# Patient Record
Sex: MALE | Race: WHITE | Employment: OTHER | URBAN - METROPOLITAN AREA
[De-identification: names, ages, dates, MRNs, and addresses within clinical notes are randomized per-mention and may not be internally consistent; named-entity substitution may affect disease eponyms.]

---

## 2023-06-21 ENCOUNTER — ANESTHESIA (OUTPATIENT)
Dept: ENDOSCOPY | Age: 68
End: 2023-06-21
Payer: MEDICARE

## 2023-06-21 ENCOUNTER — HOSPITAL ENCOUNTER (OUTPATIENT)
Age: 68
Setting detail: OUTPATIENT SURGERY
Discharge: HOME | End: 2023-06-21
Attending: INTERNAL MEDICINE | Admitting: INTERNAL MEDICINE
Payer: MEDICARE

## 2023-06-21 ENCOUNTER — ANESTHESIA EVENT (OUTPATIENT)
Dept: ENDOSCOPY | Age: 68
End: 2023-06-21
Payer: MEDICARE

## 2023-06-21 VITALS
HEIGHT: 68 IN | WEIGHT: 190 LBS | HEART RATE: 78 BPM | BODY MASS INDEX: 28.79 KG/M2 | DIASTOLIC BLOOD PRESSURE: 86 MMHG | SYSTOLIC BLOOD PRESSURE: 131 MMHG | RESPIRATION RATE: 16 BRPM | OXYGEN SATURATION: 97 % | TEMPERATURE: 96.5 F

## 2023-06-21 DIAGNOSIS — R13.19 OTHER DYSPHAGIA: ICD-10-CM

## 2023-06-21 DIAGNOSIS — R13.19 ESOPHAGEAL DYSPHAGIA: ICD-10-CM

## 2023-06-21 LAB
GLUCOSE BLD-MCNC: 100 MG/DL (ref 70–99)
PERFORMED ON: ABNORMAL

## 2023-06-21 PROCEDURE — 3609012400 HC EGD TRANSORAL BIOPSY SINGLE/MULTIPLE: Performed by: INTERNAL MEDICINE

## 2023-06-21 PROCEDURE — 7100000010 HC PHASE II RECOVERY - FIRST 15 MIN: Performed by: INTERNAL MEDICINE

## 2023-06-21 PROCEDURE — 3700000000 HC ANESTHESIA ATTENDED CARE: Performed by: INTERNAL MEDICINE

## 2023-06-21 PROCEDURE — 88305 TISSUE EXAM BY PATHOLOGIST: CPT

## 2023-06-21 PROCEDURE — 2709999900 HC NON-CHARGEABLE SUPPLY: Performed by: INTERNAL MEDICINE

## 2023-06-21 PROCEDURE — 7100000011 HC PHASE II RECOVERY - ADDTL 15 MIN: Performed by: INTERNAL MEDICINE

## 2023-06-21 PROCEDURE — 3700000001 HC ADD 15 MINUTES (ANESTHESIA): Performed by: INTERNAL MEDICINE

## 2023-06-21 PROCEDURE — 6360000002 HC RX W HCPCS: Performed by: NURSE ANESTHETIST, CERTIFIED REGISTERED

## 2023-06-21 PROCEDURE — 88342 IMHCHEM/IMCYTCHM 1ST ANTB: CPT

## 2023-06-21 RX ORDER — SITAGLIPTIN 100 MG/1
TABLET, FILM COATED ORAL
COMMUNITY
Start: 2023-05-08

## 2023-06-21 RX ORDER — PROPOFOL 10 MG/ML
INJECTION, EMULSION INTRAVENOUS PRN
Status: DISCONTINUED | OUTPATIENT
Start: 2023-06-21 | End: 2023-06-21 | Stop reason: SDUPTHER

## 2023-06-21 RX ORDER — FINASTERIDE 5 MG/1
5 TABLET, FILM COATED ORAL DAILY
COMMUNITY
Start: 2023-03-23

## 2023-06-21 RX ORDER — SODIUM CHLORIDE 0.9 % (FLUSH) 0.9 %
5-40 SYRINGE (ML) INJECTION PRN
Status: DISCONTINUED | OUTPATIENT
Start: 2023-06-21 | End: 2023-06-21 | Stop reason: HOSPADM

## 2023-06-21 RX ORDER — OXYCODONE AND ACETAMINOPHEN 10; 325 MG/1; MG/1
TABLET ORAL
COMMUNITY
Start: 2023-06-07

## 2023-06-21 RX ORDER — INSULIN GLARGINE 100 [IU]/ML
INJECTION, SOLUTION SUBCUTANEOUS
COMMUNITY
Start: 2023-06-17

## 2023-06-21 RX ORDER — SODIUM CHLORIDE 0.9 % (FLUSH) 0.9 %
5-40 SYRINGE (ML) INJECTION EVERY 12 HOURS SCHEDULED
Status: DISCONTINUED | OUTPATIENT
Start: 2023-06-21 | End: 2023-06-21 | Stop reason: HOSPADM

## 2023-06-21 RX ORDER — MIDODRINE HYDROCHLORIDE 5 MG/1
TABLET ORAL
COMMUNITY
Start: 2023-06-19

## 2023-06-21 RX ORDER — LIDOCAINE HYDROCHLORIDE 20 MG/ML
INJECTION, SOLUTION INTRAVENOUS PRN
Status: DISCONTINUED | OUTPATIENT
Start: 2023-06-21 | End: 2023-06-21 | Stop reason: SDUPTHER

## 2023-06-21 RX ORDER — SODIUM CHLORIDE, SODIUM LACTATE, POTASSIUM CHLORIDE, CALCIUM CHLORIDE 600; 310; 30; 20 MG/100ML; MG/100ML; MG/100ML; MG/100ML
INJECTION, SOLUTION INTRAVENOUS CONTINUOUS
Status: DISCONTINUED | OUTPATIENT
Start: 2023-06-21 | End: 2023-06-21 | Stop reason: HOSPADM

## 2023-06-21 RX ORDER — PANTOPRAZOLE SODIUM 20 MG/1
20 TABLET, DELAYED RELEASE ORAL
Qty: 90 TABLET | Refills: 1 | Status: SHIPPED | OUTPATIENT
Start: 2023-06-21

## 2023-06-21 RX ORDER — LIDOCAINE HYDROCHLORIDE 10 MG/ML
1 INJECTION, SOLUTION EPIDURAL; INFILTRATION; INTRACAUDAL; PERINEURAL
Status: DISCONTINUED | OUTPATIENT
Start: 2023-06-21 | End: 2023-06-21 | Stop reason: HOSPADM

## 2023-06-21 RX ORDER — ATORVASTATIN CALCIUM 40 MG/1
TABLET, FILM COATED ORAL
COMMUNITY
Start: 2023-06-05

## 2023-06-21 RX ORDER — GABAPENTIN 600 MG/1
TABLET ORAL
COMMUNITY
Start: 2023-06-05

## 2023-06-21 RX ADMIN — PROPOFOL 50 MG: 10 INJECTION, EMULSION INTRAVENOUS at 13:05

## 2023-06-21 RX ADMIN — PROPOFOL 30 MG: 10 INJECTION, EMULSION INTRAVENOUS at 13:07

## 2023-06-21 RX ADMIN — PROPOFOL 50 MG: 10 INJECTION, EMULSION INTRAVENOUS at 13:00

## 2023-06-21 RX ADMIN — LIDOCAINE HYDROCHLORIDE 80 MG: 20 INJECTION, SOLUTION INTRAVENOUS at 13:00

## 2023-06-21 RX ADMIN — PROPOFOL 50 MG: 10 INJECTION, EMULSION INTRAVENOUS at 13:04

## 2023-06-21 ASSESSMENT — PAIN - FUNCTIONAL ASSESSMENT: PAIN_FUNCTIONAL_ASSESSMENT: 0-10

## 2023-06-21 ASSESSMENT — PAIN SCALES - WONG BAKER
WONGBAKER_NUMERICALRESPONSE: 0
WONGBAKER_NUMERICALRESPONSE: 0

## 2023-06-21 ASSESSMENT — LIFESTYLE VARIABLES: SMOKING_STATUS: 0

## 2023-06-21 ASSESSMENT — PAIN SCALES - GENERAL: PAINLEVEL_OUTOF10: 0

## 2023-06-21 NOTE — ANESTHESIA PRE PROCEDURE
Department of Anesthesiology  Preprocedure Note       Name:  Heidi Kirk   Age:  79 y.o.  :  1955                                          MRN:  [id number]         Date:  2023      Surgeon: Carlos Rogel):  Nicole Dyer MD    Procedure: Procedure(s):  ESOPHAGOGASTRODUODENOSCOPY    Medications prior to admission:   Prior to Admission medications    Medication Sig Start Date End Date Taking? Authorizing Provider   atorvastatin (LIPITOR) 40 MG tablet  23   Historical Provider, MD   finasteride (PROSCAR) 5 MG tablet Take 1 tablet by mouth daily 3/23/23   Historical Provider, MD   gabapentin (NEURONTIN) 600 MG tablet TAKE ONE TABLET BY MOUTH THREE TIMES DAILY dnf UNTIL 2023   Historical Provider, MD Cardona Ra 100 UNIT/ML injection pen  23   Historical Provider, MD   metFORMIN (GLUCOPHAGE) 1000 MG tablet Take 1 tablet by mouth 2 times daily (with meals) 23   Historical Provider, MD   oxyCODONE-acetaminophen (PERCOCET)  MG per tablet TAKE ONE TABLET BY MOUTH EVERY 8 HOURS AS NEEDED FOR 30 DAYS dnf UNTIL 2023   Historical Provider, MD   JANUVIA 100 MG tablet  23   Historical Provider, MD   midodrine (PROAMATINE) 5 MG tablet  23   Historical Provider, MD       Current medications:    Current Facility-Administered Medications   Medication Dose Route Frequency Provider Last Rate Last Admin    lidocaine PF 1 % injection 1 mL  1 mL IntraDERmal Once PRN Collins Gibson MD        lactated ringers IV soln infusion   IntraVENous Continuous Collins Gibson MD        sodium chloride flush 0.9 % injection 5-40 mL  5-40 mL IntraVENous 2 times per day Collins Gibson MD        sodium chloride flush 0.9 % injection 5-40 mL  5-40 mL IntraVENous PRN Collins Gibson MD           Allergies:  No Known Allergies    Problem List:  There is no problem list on file for this patient.       Past Medical History:        Diagnosis Date    Arthritis     Diabetes

## 2023-06-21 NOTE — ANESTHESIA POSTPROCEDURE EVALUATION
Department of Anesthesiology  Postprocedure Note    Patient: Patrice Lombardo  MRN: [id number]  YOB: 1955  Date of evaluation: 6/21/2023      Procedure Summary     Date: 06/21/23 Room / Location: [place] [address] / [facility]    Anesthesia Start: 1257 Anesthesia Stop:     Procedures:       ESOPHAGOGASTRODUODENOSCOPY      EGD BIOPSY Diagnosis:       Other dysphagia      Esophageal dysphagia      (Other dysphagia [R13.19])      (Esophageal dysphagia [R13.19])    Surgeons: Lars Escudero MD Responsible Provider:     Anesthesia Type: MAC ASA Status: 2          Anesthesia Type: No value filed.     Christine Phase I: Christine Score: 10    Christine Phase II:        Anesthesia Post Evaluation    Patient location during evaluation: bedside  Level of consciousness: awake and alert  Airway patency: patent  Nausea & Vomiting: no vomiting  Complications: no  Cardiovascular status: blood pressure returned to baseline  Respiratory status: acceptable  Hydration status: euvolemic  Multimodal analgesia pain management approach

## 2023-06-21 NOTE — H&P
GI Endoscopy Outpatient Procedure H&P    Patient: Heidi Kirk MRN: [id number]     YOB: 1955  Age: 79 y.o. Sex: male    Unit: [facility] ENDOSCOPY Room/Bed: Endo Pool/NONE Location: [address] [place] [address]     Referring  Physician: Raymond Wharton MD    Heidi Kirk is a 79 y.o. male  here for following procedure:   PROCEDURE:    Procedure(s):  ESOPHAGOGASTRODUODENOSCOPY  EGD BIOPSY    PRE OPERATIVE DIAGNOSIS:   Pre-Op Diagnosis Codes:     * Other dysphagia [R13.19]     * Esophageal dysphagia [R13.19]      INDICATIONS:   Same as the preop diagnosis. Pt seen and examined. H& P reviewed. History Obtained From:  patient  Ch GERD on Prilosec 40 mg daily         Post prandial abdominal pain resolved         On and off Dysphagia to solids- previously helped with esophageal dilation         Alternating diarrhea with occasional constipation X 3 to 4 years resolved. h/o on and off Fecal incontinence resolved on Metamucil and cerial with fiber . Pt diagnosed with severe DDD Lumbar spine and told to be Fecal incontinence possibly related to spinal pathology. Metformin > 15 years. On Align generic since 4 / 2020 with significant improvement         H/o chronic back issues/pain better after steroid shot. Some Dysphagia intermittent to lower esophagus. Suspected SIBO status post course of Flagyl helped. some gas and bloating         _________________________________________________________________________________    '       RELEVENT GI HISTORY:         -- 7/1/2022 EGD/COLONOSCOPY: Post Op Diagnosis - EGD DIAGNOSIS: 1) Esophageal Dysphagia s/p Whyte dilation with 48 fr and 52 Fr 2) Mild to Moderate antral gastritis COLONOSCOPY DIAGNOSIS: 1) Normal colonic mucosa throughout including the Terminal ileum up to 10 cm. 2) Cecal and Left colonic Diverticulosis. PATH: negative, recall Colonoscopy 10 years (7/2032).        --Fibroscan 5/20/21: Fibrosis: F0 , Steatosis grade:

## 2023-06-21 NOTE — PROCEDURES
scheduled. Gastric or Duodenal ulcer present: no  Biopsy done to rule out Helicobacter pylori infection:  yes  MEGAN PATH SPECIMEN SENT:  yes  PHOTOGRAPH TAKEN:  yes In MD Reports     COMPLICATIONS DURING PROCEDURE:  None      EBL: None . DISPOSITION: The patient was sent to the recovery room in stable condition. CC: Primary Care/Referring Physician(s): Tramaine Plasencia MD  Patient meets criteria for discharge/transfer: yes  Results and plan discussed with the patient and family in detail. Signed By:   Ernesto Carrera MD  [address]  1:22 PM 6/21/2023       \"Please note that some or all of this record was generated using voice recognition software. If there are any questions about the content of this document, please contact the author as some errors of transcription may have occurred. \"

## 2023-06-21 NOTE — DISCHARGE INSTRUCTIONS
ENDOSCOPY DISCHARGE INSTRUCTIONS:    Call the physician that did your procedure for any questions or concern:    [facility]: [phone]  DR. LOYDA [place]      ACTIVITY:    There are potential side effects to the medications used for sedation and anesthesia during your procedure. These include:  Dizziness or light-headedness, confusion or memory loss, delayed reaction times, loss of coordination, nausea and vomiting. Because of your increased risk for injury, we ask that you observe the following precautions: For the next 24 hours,  DO NOT operate an automobile, bicycle, motorcycle, , power tools or large equipment of any kind. Do not drink alcohol, sign any legal documents or make any legal decisions for 24 hours. Do not bend your head over lower than your heart. DO sit on the side of bed/couch awhile before getting up. Plan on bedrest or quiet relaxation today. You may resume normal activities in 24 hours. DIET:    Your first meal today should be light, avoiding spicy and fatty foods. If you tolerate this first meal, then you may advance to your regular diet unless otherwise advised by your physician. NORMAL SYMPTOMS:  -Mild sore throat if youve had an EGD   -Gaseous discomfort    NOTIFY YOUR PHYSICIAN IF THESE SYMPTOMS OCCUR:  1. Fever (greater than 100)  5. Increased abdominal bloating  2. Severe pain    6. Excessive bleeding  3. Nausea and vomiting  7. Chest pain                                                                    4. Chills    8. Shortness of breath    ADDITIONAL INSTRUCTIONS:    Biopsy results: Call [address],[facility] for biopsy results in 1 week    Educational Information:          Please review these discharge instructions this evening or tomorrow for  information you may have forgotten. We want to thank you for choosing the [facility] as your health care provider. We always strive to provide you with excellent care while you are here.  You may

## 2023-06-21 NOTE — PROGRESS NOTES
Ambulatory Surgery/Procedure Discharge Note    Vitals:    06/21/23 1340   BP: 131/86   Pulse: 78   Resp: 16   Temp:    SpO2: 97%       No intake/output data recorded. Restroom use offered before discharge. Yes    Pain assessment:  level of pain (1-10, 10 severe)  Pain Level: 0  Pt to Endoscopy recovery post EGD. Pt denies pain at this time. Pt denies nausea at this time, pt tolerating PO fluids well. Per room staff, pt IV placed in pre-op infiltrated, new IV placed in Endo procedure room, pt denies pain at infiltration site. Discharge instructions given to pt's wife and she state understanding of these instructions. Pt and pt's wife state that pt is \"ready to go. \"       Patient discharged to home/self care.  Patient discharged via wheel chair by transporter to waiting family/S.O.       6/21/2023 1:51 PM

## 2025-08-07 ENCOUNTER — TRANSCRIBE ORDERS (OUTPATIENT)
Dept: ADMINISTRATIVE | Age: 70
End: 2025-08-07

## 2025-08-07 DIAGNOSIS — M48.062 SPINAL STENOSIS, LUMBAR REGION, WITH NEUROGENIC CLAUDICATION: ICD-10-CM

## 2025-08-07 DIAGNOSIS — M43.16 SPONDYLOLISTHESIS OF LUMBAR REGION: Primary | ICD-10-CM

## 2025-08-17 ENCOUNTER — HOSPITAL ENCOUNTER (OUTPATIENT)
Age: 70
Discharge: HOME | End: 2025-08-17
Payer: MEDICARE

## 2025-08-17 DIAGNOSIS — M48.062 SPINAL STENOSIS, LUMBAR REGION, WITH NEUROGENIC CLAUDICATION: ICD-10-CM

## 2025-08-17 DIAGNOSIS — M43.16 SPONDYLOLISTHESIS OF LUMBAR REGION: ICD-10-CM

## 2025-08-17 PROCEDURE — 72148 MRI LUMBAR SPINE W/O DYE: CPT

## (undated) DEVICE — CANNULA SAMP CO2 AD GRN 7FT CO2 AND 7FT O2 TBNG UNIV CONN

## (undated) DEVICE — FORCEPS BX L240CM JAW DIA2.8MM L CAP W/ NDL MIC MESH TOOTH